# Patient Record
Sex: FEMALE | Race: WHITE | NOT HISPANIC OR LATINO
[De-identification: names, ages, dates, MRNs, and addresses within clinical notes are randomized per-mention and may not be internally consistent; named-entity substitution may affect disease eponyms.]

---

## 2022-06-28 ENCOUNTER — NON-APPOINTMENT (OUTPATIENT)
Age: 67
End: 2022-06-28

## 2023-11-28 ENCOUNTER — APPOINTMENT (OUTPATIENT)
Dept: OTOLARYNGOLOGY | Facility: CLINIC | Age: 68
End: 2023-11-28

## 2024-05-06 ENCOUNTER — APPOINTMENT (OUTPATIENT)
Dept: OTOLARYNGOLOGY | Facility: CLINIC | Age: 69
End: 2024-05-06
Payer: MEDICARE

## 2024-05-06 ENCOUNTER — TRANSCRIPTION ENCOUNTER (OUTPATIENT)
Age: 69
End: 2024-05-06

## 2024-05-06 ENCOUNTER — LABORATORY RESULT (OUTPATIENT)
Age: 69
End: 2024-05-06

## 2024-05-06 VITALS
TEMPERATURE: 98 F | SYSTOLIC BLOOD PRESSURE: 101 MMHG | DIASTOLIC BLOOD PRESSURE: 56 MMHG | WEIGHT: 115 LBS | BODY MASS INDEX: 19.63 KG/M2 | HEIGHT: 64 IN | OXYGEN SATURATION: 98 % | HEART RATE: 59 BPM

## 2024-05-06 DIAGNOSIS — Z82.3 FAMILY HISTORY OF STROKE: ICD-10-CM

## 2024-05-06 DIAGNOSIS — Z78.9 OTHER SPECIFIED HEALTH STATUS: ICD-10-CM

## 2024-05-06 DIAGNOSIS — Z87.891 PERSONAL HISTORY OF NICOTINE DEPENDENCE: ICD-10-CM

## 2024-05-06 DIAGNOSIS — Z82.49 FAMILY HISTORY OF ISCHEMIC HEART DISEASE AND OTHER DISEASES OF THE CIRCULATORY SYSTEM: ICD-10-CM

## 2024-05-06 DIAGNOSIS — Z80.9 FAMILY HISTORY OF MALIGNANT NEOPLASM, UNSPECIFIED: ICD-10-CM

## 2024-05-06 DIAGNOSIS — Z81.1 FAMILY HISTORY OF ALCOHOL ABUSE AND DEPENDENCE: ICD-10-CM

## 2024-05-06 PROBLEM — Z00.00 ENCOUNTER FOR PREVENTIVE HEALTH EXAMINATION: Status: ACTIVE | Noted: 2024-05-06

## 2024-05-06 PROCEDURE — 99204 OFFICE O/P NEW MOD 45 MIN: CPT | Mod: 25

## 2024-05-06 PROCEDURE — 31231 NASAL ENDOSCOPY DX: CPT

## 2024-05-06 NOTE — CONSULT LETTER
[Dear  ___] : Dear  [unfilled], [Consult Letter:] : I had the pleasure of evaluating your patient, [unfilled]. [Please see my note below.] : Please see my note below. [Consult Closing:] : Thank you very much for allowing me to participate in the care of this patient.  If you have any questions, please do not hesitate to contact me. [Sincerely,] : Sincerely, [FreeTextEntry3] : MARIA ISABEL Lindsay Jr, MD, FAAOHNS Otolaryngologist [facility]

## 2024-05-06 NOTE — HISTORY OF PRESENT ILLNESS
[de-identified] : Four sinus infections tx w/ abx in the last year with discolored rhinorrhea and some facial pressure. Also had an episode of aphonia 11/23 treated w/ abx. Breaths well through her nose in general. Cat allergy. Has sneezing attacks at some random points such as [place].

## 2024-05-06 NOTE — ASSESSMENT
[FreeTextEntry1] : We discussed what sounds like recurrent acute sinusitis but will r/o CS w/ CT.   Irrigate; we discussed allergen mitigation at home

## 2024-05-06 NOTE — PROCEDURE
[FreeTextEntry6] : Indication: requirement for exam not possible via anterior rhinoscopy; chronic nasal obstruction After verbal consent and the administration of an aerosolized oxymetazoline/lidocaine mix, examination was performed with a flexible endoscope attached to a video monitoring system. Findings: Septum: undulating Mucosa: normal Polyposis: not present Inferior turbinates: large, pos Afrin test Middle and superior turbinates: normal Inferior meatus: unremarkable Middle meatus: unremarkable Superior meatus: unremarkable Speno-ethmoidal recess: unremarkable Nasopharynx: unremarkable Secretions: unremarkable Other findings: none

## 2024-05-07 LAB
A ALTERNATA IGE QN: 0.21 KUA/L
A FUMIGATUS IGE QN: <0.1 KUA/L
C HERBARUM IGE QN: <0.1 KUA/L
DEPRECATED A ALTERNATA IGE RAST QL: NORMAL (ref 0–?)
DEPRECATED A FUMIGATUS IGE RAST QL: 0 (ref 0–?)
DEPRECATED C HERBARUM IGE RAST QL: 0 (ref 0–?)
DEPRECATED P NOTATUM IGE RAST QL: 0 (ref 0–?)
DEPRECATED S ROSTRATA IGE RAST QL: 0
HCT VFR BLD CALC: 40.3 %
HGB BLD-MCNC: 13 G/DL
IGE SER-MCNC: 650 KU/L
MCHC RBC-ENTMCNC: 32.3 GM/DL
MCHC RBC-ENTMCNC: 33 PG
MCV RBC AUTO: 102.3 FL
P NOTATUM IGE QN: <0.1 KUA/L
PLATELET # BLD AUTO: 187 K/UL
RBC # BLD: 3.94 M/UL
RBC # FLD: 13.9 %
S ROSTRATA IGE QN: <0.1 KUA/L
WBC # FLD AUTO: 9.55 K/UL

## 2024-05-20 ENCOUNTER — APPOINTMENT (OUTPATIENT)
Dept: CT IMAGING | Facility: CLINIC | Age: 69
End: 2024-05-20
Payer: MEDICARE

## 2024-05-20 ENCOUNTER — OUTPATIENT (OUTPATIENT)
Dept: OUTPATIENT SERVICES | Facility: HOSPITAL | Age: 69
LOS: 1 days | End: 2024-05-20

## 2024-05-20 PROCEDURE — 70486 CT MAXILLOFACIAL W/O DYE: CPT | Mod: 26,MH

## 2024-05-29 ENCOUNTER — APPOINTMENT (OUTPATIENT)
Dept: OTOLARYNGOLOGY | Facility: CLINIC | Age: 69
End: 2024-05-29
Payer: MEDICARE

## 2024-05-29 VITALS
OXYGEN SATURATION: 98 % | HEIGHT: 64 IN | SYSTOLIC BLOOD PRESSURE: 108 MMHG | HEART RATE: 53 BPM | TEMPERATURE: 97.8 F | BODY MASS INDEX: 19.63 KG/M2 | DIASTOLIC BLOOD PRESSURE: 64 MMHG | WEIGHT: 115 LBS

## 2024-05-29 DIAGNOSIS — J30.89 OTHER ALLERGIC RHINITIS: ICD-10-CM

## 2024-05-29 DIAGNOSIS — J32.9 CHRONIC SINUSITIS, UNSPECIFIED: ICD-10-CM

## 2024-05-29 PROCEDURE — G2211 COMPLEX E/M VISIT ADD ON: CPT

## 2024-05-29 PROCEDURE — 99214 OFFICE O/P EST MOD 30 MIN: CPT

## 2024-05-29 RX ORDER — FLUTICASONE PROPIONATE 50 UG/1
50 SPRAY, METERED NASAL
Qty: 3 | Refills: 1 | Status: ACTIVE | COMMUNITY
Start: 2024-05-29 | End: 1900-01-01

## 2024-05-29 NOTE — DATA REVIEWED
[de-identified] : 5/24: mild IgE panel negative but total IgE 650; CBC ok [de-identified] : CT sinus 5/24: moderate L max dz, trace in the R max; diseased L 2nd max molar

## 2024-05-29 NOTE — HISTORY OF PRESENT ILLNESS
[de-identified] : Four sinus infections tx w/ abx in the last year with discolored rhinorrhea and some facial pressure- she just completed another course of amox/clav for another episode. Also had an episode of aphonia 11/23 treated w/ abx. Breaths well through her nose in general. Now s/p CT Cat allergy. Has sneezing attacks at some random points such as [place]. Not using a nasal steroid. She hasn't seen an allergist lately but at some point in the past she was told that she has a mold allergy.

## 2024-05-29 NOTE — ASSESSMENT
[FreeTextEntry1] : We discussed her overall mild sinus dz in light of untreated allergies and the need for a dental eval; start fluticasone and RTC 1 month sooner prn

## 2025-07-28 ENCOUNTER — RX RENEWAL (OUTPATIENT)
Age: 70
End: 2025-07-28